# Patient Record
Sex: FEMALE | NOT HISPANIC OR LATINO | URBAN - METROPOLITAN AREA
[De-identification: names, ages, dates, MRNs, and addresses within clinical notes are randomized per-mention and may not be internally consistent; named-entity substitution may affect disease eponyms.]

---

## 2017-01-23 PROBLEM — Z98.891 HISTORY OF PRIMARY CESAREAN SECTION: Status: ACTIVE | Noted: 2017-01-23

## 2019-11-13 ENCOUNTER — IMPORTED ENCOUNTER (OUTPATIENT)
Dept: URBAN - METROPOLITAN AREA CLINIC 1 | Facility: CLINIC | Age: 39
End: 2019-11-13

## 2019-11-13 PROBLEM — H50.52: Noted: 2019-11-13

## 2019-11-13 PROBLEM — H52.13: Noted: 2019-11-13

## 2019-11-13 PROCEDURE — 92015 DETERMINE REFRACTIVE STATE: CPT

## 2019-11-13 PROCEDURE — 92004 COMPRE OPH EXAM NEW PT 1/>: CPT

## 2019-11-13 NOTE — PATIENT DISCUSSION
1. No sign of optic neuritis blurred vision due to refractive problem. Needs to refit CL and take better care of them. Recommend daily wear only Advised to see a PCP about her other symptoms. 2. Exophoria OU 3. Return for an appointment for Contact lens evaluation with Dr. Alysha Mann.

## 2019-11-15 ENCOUNTER — IMPORTED ENCOUNTER (OUTPATIENT)
Dept: URBAN - METROPOLITAN AREA CLINIC 1 | Facility: CLINIC | Age: 39
End: 2019-11-15

## 2019-11-15 PROBLEM — H52.13: Noted: 2019-11-15

## 2019-11-15 PROCEDURE — S0621 ROUTINE OPHTHALMOLOGICAL EXA: HCPCS

## 2019-11-15 NOTE — PATIENT DISCUSSION
1. Myopia: Rx was given for correction if indicated and requested. 2.  HANDY: AT's TID OU routinely. 3. Exophoria OUCTL trials givenReturn for an appointment in 1 week CC with Dr. Alysha Mann.

## 2019-12-05 PROBLEM — R42 DIZZINESS: Status: ACTIVE | Noted: 2019-12-05

## 2019-12-05 PROBLEM — H53.9 VISUAL CHANGES: Status: ACTIVE | Noted: 2019-12-05

## 2019-12-12 ENCOUNTER — IMPORTED ENCOUNTER (OUTPATIENT)
Dept: URBAN - METROPOLITAN AREA CLINIC 1 | Facility: CLINIC | Age: 39
End: 2019-12-12

## 2019-12-12 NOTE — PATIENT DISCUSSION
CC today- good fit good movement and vision is doing well. Recommend 1.00 otc readers to help with reading. CTL finalized today. Pt advised to follow up as scheduled with her neurologist. Follow up under her medical insurance for any changes in her vision. Return for an appointment in 1 year 40/cc with Dr. Chantel Whalen.

## 2021-02-24 ENCOUNTER — HOSPITAL ENCOUNTER (OUTPATIENT)
Dept: PHYSICAL THERAPY | Age: 41
Discharge: HOME | End: 2021-02-24
Payer: COMMERCIAL

## 2021-02-24 PROCEDURE — 97162 PT EVAL MOD COMPLEX 30 MIN: CPT | Performed by: PHYSICAL THERAPIST

## 2021-02-24 PROCEDURE — 97140 MANUAL THERAPY 1/> REGIONS: CPT | Performed by: PHYSICAL THERAPIST

## 2021-02-24 PROCEDURE — 97530 THERAPEUTIC ACTIVITIES: CPT | Performed by: PHYSICAL THERAPIST

## 2021-02-24 NOTE — PROGRESS NOTES
[address]illa PHYSICAL THERAPY AT [facility]  [address] [facility] [address], [place]  Phone [phone]  Fax [phone] / [address] [address] PHYSICAL THERAPY SERVICES  Patient Name: Jovana Ramirez : 1980   Medical   Diagnosis: Radiculopathy, lumbosacral region [M54.17] Treatment Diagnosis: Low back pain   Onset Date: 2 weeks ago     Referral Source: Suanne Mortimer, MD Start of Care [facility], [place]): 2021   Prior Hospitalization: See medical history Provider #: [id number]   Prior Level of Function: IND   Comorbidities: EDS   Medications: Verified on Patient Summary List   The Plan of Care and following information is based on the information from the initial evaluation.   =================================================================================  Assessment / key information:  Pt is a 41year old female with subjective reports of increased low back stiffness that started 2 weeks ago for unknown reasons. She reports that as the night goes on her back just stiffens as the day continues. She denies functional limitations however, she is avoiding lifting because she does not want to make her symptoms worse. 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 She reports occasional numbness in the L LE the last time 2 weeks ago. Negative for red flags. FOTO: 72/100. Upon evaluation, pt ambulates with normalized gait pattern. AROM of the lumbar spine is WNLs. SI alignment: L posterior rotated noted with + sit to supine test. B hip strength: flexion: 4/5, abduction: 5/5, extension: 4+/5. Repeated movement test was negative. ANA did not exacerbate low back. REIL increased radicular symptoms in the hand.  Pt would benefit from a course of skilled PT to address above deficits to return to PLOF symptom free.   =================================================================================  Eval Complexity: History: MEDIUM  Complexity : 1-2 comorbidities / personal factors will impact the outcome/ POC Exam:HIGH Complexity : 4+ Standardized tests and measures addressing body structure, function, activity limitation and / or participation in recreation  Presentation: MEDIUM Complexity : Evolving with changing characteristics  Clinical Decision Making:MEDIUM Complexity : FOTO score of 26-74Overall Complexity:MEDIUM  Problem List: pain affecting function, decrease ROM, decrease strength, impaired gait/ balance, decrease ADL/ functional abilitiies, decrease activity tolerance, decrease flexibility/ joint mobility and decrease transfer abilities   Treatment Plan may include any combination of the following: Therapeutic exercise, Therapeutic activities, Neuromuscular re-education, Physical agent/modality, Gait/balance training, Manual therapy and Patient education  Patient / Family readiness to learn indicated by: asking questions and trying to perform skills  Persons(s) to be included in education: patient (P)  Barriers to Learning/Limitations: None  Measures taken:    Patient Goal (s): Learn how to prevent further injury; learn how to improve the strain   Patient self reported health status: fair  Rehabilitation Potential: good   Short Term Goals: To be accomplished in  1  weeks:  1. Pt will be IND and compliant with HEP for self-management of symptoms.  Long Term Goals: To be accomplished in  4  weeks:  1. Pt will demonstrate proper desk ergonomics/lifting mechanics to prevent stress on the lumbar stress with functional activities. 2. Pt will present to clinic for 3 consecutive sessions as an indicator of improved stability. 3. Pt will improve B hip strength to 5/5 to improve gait stability. 4. Pt will improve FOTO score to at least 89/100 as a functional indicator of improved mobility.    Frequency / Duration:   Patient to be seen  2  times per week for 4  weeks:  Patient / Caregiver education and instruction: exercises  Therapist Signature: Kei Perera DPT Date: 5/92/5463   Certification Period: NA Time: 3:25 PM   ===========================================================================================  I certify that the above Physical Therapy Services are being furnished while the patient is under my care. I agree with the treatment plan and certify that this therapy is necessary. Physician Signature:        Date:       Time:     Please sign and return to [facility] or you may fax the signed copy to [phone]. Thank you.   Arsalan Nickerson MD

## 2021-02-24 NOTE — PROGRESS NOTES
PT DAILY TREATMENT NOTE     Patient Name: Lorri Gomez  Date:2021  : 1980  [x]  Patient  Verified  Payor: Jose Limb / Plan: Jaskaran Bowden PPO / Product Type: PPO /    In time:240  Out time:315  Total Treatment Time (min): 35  Visit #: 1 of 8    Treatment Area: Radiculopathy, lumbosacral region [M54.17]    SUBJECTIVE  Pain Level (0-10 scale): stiff  Any medication changes, allergies to medications, adverse drug reactions, diagnosis change, or new procedure performed?: [x] No    [] Yes (see summary sheet for update)  Subjective functional status/changes:   [] No changes reported  Pt is a 41year old female with subjective reports of increased low back stiffness that started 2 weeks ago for unknown reasons. She reports that as the night goes on her back just stiffens as the day continues. She denies functional limitations however, she is avoiding lifting because she does not want to make her symptoms worse. 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 She reports occasional numbness in the L LE the last time 2 weeks ago. Negative for red flags. FOTO: 72/100. OBJECTIVE     17 min [x]Eval                  []Re-Eval       8 min Manual Therapy: Technique:      MET to correct L posterior rotation   Rationale:      decrease pain, increase ROM, increase tissue extensibility and decrease trigger points to improve patient's ability to improve standing tolerance       With   [] TE   [x] TA 10 min   [] neuro   [] other: Patient Education: [x] Review HEP    [] Progressed/Changed HEP based on:   [] positioning   [x] body mechanics   [] transfers   [] heat/ice application    [] Graston Education: Explained the effects and benefits of Graston Technique therapy including potential for post treatment soreness and bruising. [x] Other: desk ergonomics, healthy back posture, lifting mechanics     Other Objective/Functional Measures:   Upon evaluation, pt ambulates with normalized gait pattern.  AROM of the lumbar spine is WNLs. SI alignment: L posterior rotated noted with + sit to supine test. B hip strength: flexion: 4/5, abduction: 5/5, extension: 4+/5. Repeated movement test was negative. ANA did not exacerbate low back. REIL increased radicular symptoms in the hand. Pain Level (0-10 scale) post treatment: 0/10    ASSESSMENT/Changes in Function:    [x]  See Plan of Care  []  See progress note/recertification  []  See Discharge Summary         Progress towards goals / Updated goals:  PER POC    PLAN  []  Upgrade activities as tolerated     [x]  Continue plan of care  []  Update interventions per flow sheet       []  Discharge due to:_  [x]  Other:1-2x/week for 4 weeks     Justification for Eval Code Complexity:  Patient History : chronicity, EDS  Examination see exam   Clinical Presentation: evolving  Clinical Decision Making : FOTO : 75 /100        Yajaira Pollock DPT 2/24/2021  2:42 PM    No future appointments.

## 2021-03-03 ENCOUNTER — APPOINTMENT (OUTPATIENT)
Dept: PHYSICAL THERAPY | Age: 41
End: 2021-03-03

## 2021-03-10 ENCOUNTER — APPOINTMENT (OUTPATIENT)
Dept: PHYSICAL THERAPY | Age: 41
End: 2021-03-10

## 2021-03-26 NOTE — PROGRESS NOTES
[address] PHYSICAL THERAPY AT [facility]  [address] [place] [address], [facility], [place]  Phone [phone]  Fax  SUMMARY  Patient Name: Gerson Armstrong : 1980   Treatment/Medical Diagnosis: Radiculopathy, lumbosacral region [M54.17]   Referral Source: Maine Abdi MD     Date of Initial Visit: 21 Attended Visits: 1 Missed Visits: 0     SUMMARY OF TREATMENT  Pt seen for evaluation only on 21 for low back pain. Pt failed to make further FU appts and therefore, pt to be DC'd due to non-compliance. Please refer to evaluation for measurements. RECOMMENDATIONS  Discontinue therapy due to lack of attendance or compliance. If you have any questions/comments please contact us directly at [phone]. Thank you for allowing us to assist in the care of your patient.     Therapist Signature: Viola Bianchi DPT Date: 3/26/21     Time: 10:50 AM

## 2021-05-28 ENCOUNTER — IMPORTED ENCOUNTER (OUTPATIENT)
Dept: URBAN - METROPOLITAN AREA CLINIC 1 | Facility: CLINIC | Age: 41
End: 2021-05-28

## 2021-05-28 PROBLEM — H16.001: Noted: 2021-05-28

## 2021-05-28 PROBLEM — H16.002: Noted: 2021-05-28

## 2021-05-28 PROCEDURE — 92012 INTRM OPH EXAM EST PATIENT: CPT

## 2021-05-28 NOTE — PATIENT DISCUSSION
1. Peripheral Corneal Ulcer OS -- Begin Besivance Q2Hrs OS (Erx'd & Coupon given). Begin Erythomycin QHS OS (Erx'd). Begin PF ATs QID OS (Sample of Refresh Capo 3's given). Return immediately if ulcer larger or symptoms worsen. No contact lens use. Discussed with patient the importance of only wearing lenses for FDA approved amount of time and not sleeping in lenses. 2.  Dry Eyes OU -- Increase ATs to Q2Hrs OU routinely. 3.  Pt wants to be dilated secondary to previous Dr saying she had a 'blister' on OD. Return for an appointment in Wednesday 10/DFE with Dr. Marce Chacko.

## 2021-06-07 ENCOUNTER — IMPORTED ENCOUNTER (OUTPATIENT)
Dept: URBAN - METROPOLITAN AREA CLINIC 1 | Facility: CLINIC | Age: 41
End: 2021-06-07

## 2021-06-07 PROBLEM — H17.822: Noted: 2021-06-07

## 2021-06-07 PROBLEM — H35.711: Noted: 2021-06-07

## 2021-06-07 PROCEDURE — 92012 INTRM OPH EXAM EST PATIENT: CPT

## 2021-06-07 PROCEDURE — 92134 CPTRZ OPH DX IMG PST SGM RTA: CPT

## 2021-06-07 NOTE — PATIENT DISCUSSION
1. 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 D/c Erythomycin QHS OS. Discussed with patient the importance of only wearing contact lenses for FDA approved amount of time and not sleeping in lenses. 2.  Dry Eyes OU -- Continue ATs Q2Hrs OU routinely. 3.  Central Serous Retinopathy OD -- pt noted visual disturbance for over a year. Will refer to Retinal specialist for further evaluation and possible treatment. Return for an appointment within 3 weeks for evaluation of Central Serous Retinopathy OD with Dr. Faith Callahan. Return for an appointment in within 1 month for 40/cc with Dr. Ry Perry.

## 2022-04-03 ASSESSMENT — KERATOMETRY
OS_AXISANGLE_DEGREES: 169
OD_AXISANGLE_DEGREES: 175
OS_AXISANGLE2_DEGREES: 079
OD_AXISANGLE2_DEGREES: 085
OS_K1POWER_DIOPTERS: 42.75
OD_K2POWER_DIOPTERS: 45.00
OS_K2POWER_DIOPTERS: 45.00
OD_K1POWER_DIOPTERS: 42.75

## 2022-04-03 ASSESSMENT — TONOMETRY
OS_IOP_MMHG: 14
OD_IOP_MMHG: 15
OS_IOP_MMHG: 13
OD_IOP_MMHG: 14
OD_IOP_MMHG: 15

## 2022-04-03 ASSESSMENT — VISUAL ACUITY
OD_CC: J1
OD_SC: 20/20
OS_SC: 20/30
OS_CC: J1
OS_SC: 20/25
OS_SC: 20/20
OS_SC: 20/30
OD_SC: 20/20
OD_SC: 20/20
OS_SC: 20/20
OD_SC: 20/20
OD_SC: 20/20

## 2022-10-18 ENCOUNTER — HOSPITAL ENCOUNTER (OUTPATIENT)
Dept: OCCUPATIONAL MEDICINE | Age: 42
Discharge: HOME | End: 2022-10-18
Attending: FAMILY MEDICINE

## 2022-10-18 ENCOUNTER — OFFICE VISIT (OUTPATIENT)
Dept: ORTHOPEDIC SURGERY | Age: 42
End: 2022-10-18
Payer: COMMERCIAL

## 2022-10-18 VITALS — RESPIRATION RATE: 14 BRPM | HEIGHT: 64 IN | BODY MASS INDEX: 25.61 KG/M2 | WEIGHT: 150 LBS

## 2022-10-18 DIAGNOSIS — M75.42 SHOULDER IMPINGEMENT, LEFT: Primary | ICD-10-CM

## 2022-10-18 DIAGNOSIS — M75.42 SHOULDER IMPINGEMENT, LEFT: ICD-10-CM

## 2022-10-18 DIAGNOSIS — M75.02 ADHESIVE CAPSULITIS OF LEFT SHOULDER: ICD-10-CM

## 2022-10-18 PROCEDURE — 99204 OFFICE O/P NEW MOD 45 MIN: CPT | Performed by: FAMILY MEDICINE

## 2022-10-18 PROCEDURE — 20610 DRAIN/INJ JOINT/BURSA W/O US: CPT | Performed by: FAMILY MEDICINE

## 2022-10-18 RX ORDER — METHYLPREDNISOLONE ACETATE 40 MG/ML
40 INJECTION, SUSPENSION INTRA-ARTICULAR; INTRALESIONAL; INTRAMUSCULAR; SOFT TISSUE ONCE
Status: COMPLETED | OUTPATIENT
Start: 2022-10-18 | End: 2022-10-18

## 2022-10-18 RX ORDER — OXYCODONE AND ACETAMINOPHEN 5; 325 MG/1; MG/1
1 TABLET ORAL
Qty: 21 TABLET | Refills: 0 | Status: SHIPPED | OUTPATIENT
Start: 2022-10-18 | End: 2022-10-25

## 2022-10-18 RX ADMIN — METHYLPREDNISOLONE ACETATE 40 MG: 40 INJECTION, SUSPENSION INTRA-ARTICULAR; INTRALESIONAL; INTRAMUSCULAR; SOFT TISSUE at 15:01

## 2022-10-18 NOTE — PROGRESS NOTES
HISTORY OF PRESENT ILLNESS    Daysi Pendleton 1980 is a 38y.o. year old female comes in today as new patient for: left shoulder pain    Patients symptoms have been present for 2 months. Pain level 10 - Worst pain ever/10 superior. It has worsened with time. Patient has tried:  stretches as concern frozen shoulder and did get intense pain and weakness. Tried HEP from [facility]. Went to neuro Dr. Jonathan Funez for weakness but no Tx. IMAGING: XR left shoulder pending    Past Surgical History:   Procedure Laterality Date    HX GYN  2017     x2     TN  DELIVERY ONLY       Social History     Socioeconomic History    Marital status:     Years of education: 14   Tobacco Use    Smoking status: Never    Smokeless tobacco: Never   Vaping Use    Vaping Use: Never used   Substance and Sexual Activity    Alcohol use: No    Drug use: No     Comment: CBD gummies to help sleep        Past Medical History:   Diagnosis Date    Postpartum depression      History reviewed. No pertinent family history. ROS:  No numb      Objective:  Resp 14   Ht 5' 4\" (1.626 m)   Wt 150 lb (68 kg)   BMI 25.75 kg/m²   NEURO:  Sensation intact light touch B/L upper extremities. right hand dominant. DTRs normal biceps and triceps   M/S:  Shoulder ROM Decreased left. Spurling's negative bilaterally  left Shoulder:  Empty can positive External rotation negative. Internal rotation negative. [place] not tested. SLAP not tested. Load and Shift +1 Anterior, 1 Posterior. Strength +5/5 bilaterally upper extremities. Crossover test not tested. Negative atrophy bilaterally. Negative TTP at [facility] joint. Apprehension test negative. Madden-Cabrera Test positive. Yergason's test negative. Speed's test negative. Serratus anterior No  TTP. Drop arm negative        Assessment/Plan:     ICD-10-CM ICD-9-CM    1.  Shoulder impingement, left  M75.42 726.2 XR SHOULDER LT AP/LAT MIN 2 V      oxyCODONE-acetaminophen (PERCOCET) 5-325 mg per tablet      REFERRAL TO PHYSICAL THERAPY      DRAIN/INJECT LARGE JOINT/BURSA      2. Adhesive capsulitis of left shoulder  M75.02 726.0 oxyCODONE-acetaminophen (PERCOCET) 5-325 mg per tablet      REFERRAL TO PHYSICAL THERAPY          Patient (or guardian if minor) verbalizes understanding of evaluation and plan. Will start  ROM, inject subacromial, PT, and Rx for percocet PRN severe  as above and plan follow-up 4 weeks. Total time spent on encounter including chart/imaging/lab review and evaluation/documentation/demo home program/coordination of care/form completion but not including time for any procedures/manipulation 47 minutes.

## 2022-10-18 NOTE — LETTER
Name:  Ankur Madison   :  1980  MR#:  [id number]   PROCEDURAL INFORMED CONSENT FOR OPERATION / PROCEDURE   1. I (we), _____Radha Farias__________ authorize Oscar Obrien DO, FAOASM                       (Patient Name)     (Provider / Proceduralist)   and/or such assistants as may be selected by him/her, to perform the following operation/procedures   _______________inject steroid into left shoulder subacromial_____________________  _________________________________________________________________________  Note: If unable to obtain consent prior to an emergent procedure, document the emergent reason in the medical record. This procedure has been explained to my (our) satisfaction and included in the explanation was:   A) the intended benefit, nature, and extent of the procedure to be performed;   B) the significant risks involved and the probability of success;   C) alternative procedures and methods of treatment;   D) the dangers and probable consequences of such alternatives (including no procedure or treatment); E) the expected consequences of the procedure on my future health;   F) whether other qualified individuals would be performing important surgical tasks and / or whether  would be present to advise or support the procedure. I (we) understand that there are other risks of infection and other serious complications in the pre-operative/procedural and postoperative/procedural stages of my (our) care. I (we) have asked all of the questions which I (we) thought were important in deciding whether or not to undergo treatment or diagnosis. These questions have been answered to my (our) satisfaction. I (we) understand that no assurance can be given that the procedure will be a success, and no guarantee or warranty of success has been given to me (us).    2. It has been explained to me (us) that during the course of the operation/procedure, unforeseen conditions may be revealed that necessitate extension of the original procedure(s) or different procedure(s) than those set forth in Paragraph 1. I (we) authorize and request that the above-named physician, his/her assistants or his/her designees, perform procedures as necessary and desirable if deemed to be in my (our) best interest.     3. I acknowledge that other health care personnel may be observing this procedure for the purpose of medical education or other specified purposes as may be necessary as requested and/or approved by my (our) physician. 4. I (we) consent to the disposal by the hospital Pathologist of the removed tissue, parts or organs in accordance with hospital policy. Page 1 of 2          Name:  Davis Barron         :  1980         MR#:  [id number]      0. I do__x__ do not____ consent to the use of a local infiltration pain blocking agent that will be used by my provider/surgical provider to help alleviate pain during my procedure. 6. I do___ do not__X__ consent to an emergent blood transfusion in the case of a life-threatening situation that requires blood components to be administered. This consent is valid for 24 hours from the beginning of the procedure. 7. This patient does ___ or does not __X__currently have a DNR status/order. If DNR order is in place, obtain \"Addendum to the Surgical Consent for ALL Patients with a DNR Order\" to address jacy-operative status for limited intervention or DNR suspension. 8. I have read and fully understand the above Consent for Operation/Procedure and that all blanks were completed before I signed the consent.    X____________________________________ _____Radha Farias_________   Date: 10/18/2022/_______am/pm   Signature of Patient or legal representative.    ________________________ ______Lauryn Little, ATC____Armida Her LPN___  Date: 3764/_______ am/pm   Witness to Signature Printed Name Date / Time    (If patient is unable to sign or is a minor, complete the following)     Patient is a minor, ____years of age, or unable to sign because:   _________________________________________________________________________  Heather Aaron If a phone consent is obtained, consent will be documented by using two health care professionals, each affirming that the consenting party has no questions and gives consent for the procedure discussed with the physician/provider.     _________________________________ _______________________________   Date: ______/_____am/pm   2nd witness to phone consent Printed name Date / Time   Informed consent:   I have provided the explanation described above in section 1 to the patient and/or legal representative. I have provided the patient and/or legal representative with an opportunity to ask any questions about the proposed operation/procedure. _                       _ __Ector Sims, [facility]____ 10/18/2022/_______ am/pm   Provider / Proceduralist Printed Name Date / Time   This Provider / Proceduralist performing the surgery is ONLY for Office-based procedures in [place]   [x] Board certified or Board eligible by one of the [employer] [place], the [employer] [facility] the [employer], the [employer] Podiatric Medicine, the [employer] of Foot and Ankle Surgery, or other board as approved by the [facility] for medical staff appointment.    Revised 8/2/2021 Page 2 of 2

## 2022-10-18 NOTE — PROCEDURES
PROCEDURE NOTE:  Time out: 233pm  * Patient was identified by name and date of birth   * Agreement on procedure being performed was verified  * Risks and Benefits explained to the patient  * Procedure site verified and marked as necessary  * Patient was positioned for comfort  * Consent was signed and verified. Risks/benefits including but not limited to bleeding, infection, and scarring discussed and Pt wishes to proceed with procedure. The area was prepped with betadine. Ethyl chloride spray was used, under sterile technique and without ultrasound guidance  1cc of 40mg/cc methylprednisolone acetate and 2cc mepivacaine were injected into left shoulder subacromial bursa. Sterile gauze used to clean the area. Blood loss minimal.  Noticed improvement in pain Sx within 5 minutes (now rated 2/10). Tolerated procedure well. Discussed possible signs/Sx of infxn, and advised to seek care if concerned. Ultrasound images (if applicable) digitally attached to CPT order in patients chart.